# Patient Record
Sex: MALE | Race: WHITE | Employment: FULL TIME | URBAN - METROPOLITAN AREA
[De-identification: names, ages, dates, MRNs, and addresses within clinical notes are randomized per-mention and may not be internally consistent; named-entity substitution may affect disease eponyms.]

---

## 2017-03-19 ENCOUNTER — APPOINTMENT (OUTPATIENT)
Dept: CT IMAGING | Age: 32
End: 2017-03-19
Attending: EMERGENCY MEDICINE
Payer: COMMERCIAL

## 2017-03-19 ENCOUNTER — HOSPITAL ENCOUNTER (EMERGENCY)
Age: 32
Discharge: HOME | End: 2017-03-19
Attending: EMERGENCY MEDICINE
Payer: COMMERCIAL

## 2017-03-19 VITALS
HEIGHT: 74 IN | DIASTOLIC BLOOD PRESSURE: 79 MMHG | BODY MASS INDEX: 28.23 KG/M2 | HEART RATE: 82 BPM | SYSTOLIC BLOOD PRESSURE: 141 MMHG | OXYGEN SATURATION: 90 % | RESPIRATION RATE: 17 BRPM | TEMPERATURE: 98 F | WEIGHT: 220 LBS

## 2017-03-19 DIAGNOSIS — S06.320A: Primary | ICD-10-CM

## 2017-03-19 DIAGNOSIS — S01.81XA FOREHEAD LACERATION, INITIAL ENCOUNTER: ICD-10-CM

## 2017-03-19 PROCEDURE — 90715 TDAP VACCINE 7 YRS/> IM: CPT | Performed by: EMERGENCY MEDICINE

## 2017-03-19 PROCEDURE — 99284 EMERGENCY DEPT VISIT MOD MDM: CPT | Performed by: EMERGENCY MEDICINE

## 2017-03-19 PROCEDURE — 70450 CT HEAD/BRAIN W/O DYE: CPT

## 2017-03-19 PROCEDURE — 90471 IMMUNIZATION ADMIN: CPT | Performed by: EMERGENCY MEDICINE

## 2017-03-19 PROCEDURE — 74011250636 HC RX REV CODE- 250/636: Performed by: EMERGENCY MEDICINE

## 2017-03-19 RX ORDER — ONDANSETRON 4 MG/1
4 TABLET, ORALLY DISINTEGRATING ORAL
Qty: 6 TAB | Refills: 1 | Status: SHIPPED | OUTPATIENT
Start: 2017-03-19 | End: 2017-03-21

## 2017-03-19 RX ADMIN — TETANUS TOXOID, REDUCED DIPHTHERIA TOXOID AND ACELLULAR PERTUSSIS VACCINE, ADSORBED 0.5 ML: 5; 2.5; 8; 8; 2.5 SUSPENSION INTRAMUSCULAR at 04:22

## 2017-03-19 NOTE — DISCHARGE INSTRUCTIONS
As we discussed, have a very small area in your brain that may represent a small bruise to your brain. Therefore, it is important for you to get reevaluated if he develop a worsening headache, increasing nausea or vomiting, confusion, or additional concerns. Otherwise, follow-up with your primary care doctor as needed. Keep the bandage clean and dry for 24 hours. You may wash around it with a wash cloth. The glue and steri-strips should fall off in about 7-10 days. Do not apply any ointment to the wound until the strips have fallen off; it will make them fall off too soon.

## 2017-03-19 NOTE — ED TRIAGE NOTES
Pt presents to ER after slipping and falling in the bathroom after drinking ETOH tonight, pt unable to recall events leading up to fall. Large 1 \" lac to R forehead, bleeding controlled at this time.

## 2017-03-19 NOTE — ED PROVIDER NOTES
HPI Comments: 80-year-old gentleman who went to a wedding and then afterwards as he drank too much alcohol. He then fell and hit his head. I said that she heard the thud and when she went to investigate fall. Currently patient says he only has some pain in the right for head. He said he was confused about the events and doesn't remember exactly what happened. Patient says that he rarely drinks to the point of getting drunk and may average 3 or 4 drink per week. Neither he nor his wife are concerned about his alcohol use. Overall he says his pain is a 4 out of 10. Elements of this note were created using speech recognition software. As such, errors of speech recognition may be present. Patient is a 32 y.o. male presenting with head injury. The history is provided by the patient. Head Injury    Pertinent negatives include no numbness. Past Medical History:   Diagnosis Date    Crohn's disease [facility])        History reviewed. No pertinent surgical history. History reviewed. No pertinent family history. Social History     Social History    Marital status:      Spouse name: N/A    Number of children: N/A    Years of education: N/A     Occupational History    Not on file. Social History Main Topics    Smoking status: Never Smoker    Smokeless tobacco: Not on file    Alcohol use Yes    Drug use: No    Sexual activity: Not on file     Other Topics Concern    Not on file     Social History Narrative    No narrative on file         ALLERGIES: Review of patient's allergies indicates no known allergies. Review of Systems   Constitutional: Negative for activity change and appetite change. HENT: Negative for facial swelling. Musculoskeletal: Negative for arthralgias and joint swelling. Skin: Positive for wound. Negative for rash. Neurological: Positive for headaches. Negative for speech difficulty and numbness.        Vitals:    03/19/17 0345 03/19/17 0400   BP: (!) 132/92    Pulse: 79    Resp: 18    Temp: 97.7 °F (36.5 °C)    SpO2: 96% 96%   Weight: 99.8 kg (220 lb)    Height: 6' 2\" (1.88 m)             Physical Exam   Constitutional: He is oriented to person, place, and time. He appears well-developed and well-nourished. HENT:   Head: Normocephalic. Contusion and a superficial laceration across his right forehead   Eyes: Conjunctivae and EOM are normal. Pupils are equal, round, and reactive to light. Neck: Normal range of motion. Cardiovascular: Normal rate and regular rhythm. Pulmonary/Chest: Effort normal and breath sounds normal. No respiratory distress. He has no wheezes. He has no rales. He exhibits no tenderness. Abdominal: Soft. Bowel sounds are normal. There is no rebound and no guarding. Musculoskeletal: Normal range of motion. He exhibits no edema or tenderness. Lymphadenopathy:     He has no cervical adenopathy. Neurological: He is alert and oriented to person, place, and time. Skin: Skin is warm and dry. Psychiatric: He has a normal mood and affect. Nursing note and vitals reviewed. MDM  Number of Diagnoses or Management Options  Diagnosis management comments: I was able to put 5 Steri-Strips across his laceration. CT results pending. Patient CT scan shows a small questionable area in the left temporal lobe that may or may not represent a small contusion. The patient is from [facility]. I will discuss the case with the neurosurgeon. I spoke with On-call from neurosurgery advised that the patient does not need any specific follow-up and he only needs to follow-up if he has worsening symptoms.     ED Course       Procedures